# Patient Record
Sex: MALE
[De-identification: names, ages, dates, MRNs, and addresses within clinical notes are randomized per-mention and may not be internally consistent; named-entity substitution may affect disease eponyms.]

---

## 2018-01-19 ENCOUNTER — HOSPITAL ENCOUNTER (OUTPATIENT)
Dept: HOSPITAL 88 - OR | Age: 28
Discharge: HOME | End: 2018-01-19
Attending: INTERNAL MEDICINE
Payer: COMMERCIAL

## 2018-01-19 DIAGNOSIS — F41.9: ICD-10-CM

## 2018-01-19 DIAGNOSIS — K64.8: ICD-10-CM

## 2018-01-19 DIAGNOSIS — K57.30: ICD-10-CM

## 2018-01-19 DIAGNOSIS — R03.0: ICD-10-CM

## 2018-01-19 DIAGNOSIS — D12.0: ICD-10-CM

## 2018-01-19 DIAGNOSIS — K50.10: Primary | ICD-10-CM

## 2018-01-19 LAB
C DIFFICILE TOXIN A&B AMP PROB: NEGATIVE
LACTOFERRIN STL QL: NEGATIVE

## 2018-01-19 PROCEDURE — 45384 COLONOSCOPY W/LESION REMOVAL: CPT

## 2018-01-19 PROCEDURE — 45385 COLONOSCOPY W/LESION REMOVAL: CPT

## 2018-01-19 PROCEDURE — 83993 ASSAY FOR CALPROTECTIN FECAL: CPT

## 2018-01-19 PROCEDURE — 87045 FECES CULTURE AEROBIC BACT: CPT

## 2018-01-19 PROCEDURE — 45380 COLONOSCOPY AND BIOPSY: CPT

## 2018-01-19 PROCEDURE — 87493 C DIFF AMPLIFIED PROBE: CPT

## 2018-01-19 PROCEDURE — 87328 CRYPTOSPORIDIUM AG IA: CPT

## 2018-01-19 PROCEDURE — 83630 LACTOFERRIN FECAL (QUAL): CPT

## 2018-01-19 PROCEDURE — 87177 OVA AND PARASITES SMEARS: CPT

## 2018-01-19 NOTE — OPERATIVE REPORT
DATE OF PROCEDURE:  January 19, 2018 



REFERRING PHYSICIAN:  Dr. Eulogio Paris. 



PROCEDURE PERFORMED:  Colonoscopy with polypectomy and biopsies.  



INDICATIONS FOR COLONOSCOPY:  Is chronic diarrhea.



MEDICATION:  Patient was done under MAC.  Please see anesthesiologist's 

note.



PROCEDURE:  With the patient in the left lateral decubitus position, the 

flexible fiberoptic Olympus colonoscope was inserted into the rectum with 

ease and advanced all the way to the cecum.  Mucosa overlying the cecum 

appeared to be within normal limits.  The ileocecal valve was intubated, 

and the scope was advanced into the terminal ileum.  Biopsies were 

obtained.  The scope was then withdrawn back into the colon.  It was then 

withdrawn slowly, and 1 polyp was hot biopsied from the ascending colon.  

Some diverticular disease was noted in the transverse, descending and 

sigmoid colon.  There was some mild segmental colitis noted in the 

descending and the proximal sigmoid, and biopsies were obtained.  One polyp 

was snared from the distal sigmoid colon.  The rectum grossly appeared to 

be within normal limits.  The scope was then retroflexed into the distal 

rectum and small internal hemorrhoids were noted, none of which was 

actively bleeding.  The scope was then straightened out.  The scope was 

subsequently withdrawn.  An adequate stool specimen was secured and sent 

for the appropriate stool studies.  Patient tolerated the procedure well.



IMPRESSION:  

1. Ascending colon polyp hot biopsied.

2. Diverticulosis.

3. Segmental colitis, left colon, mild.  Biopsies obtained.

4. Sigmoid colon polyp snared.

5. Internal hemorrhoids, none actively bleeding.





PLAN:  Follow up histology.  Follow up stool studies.  Initiate Bentyl 10 

mg 1 p.o. t.i.d.  Patient will need a followup colonoscopy in 3 years.  



  









DD:  01/19/2018 12:24

DT:  01/19/2018 12:31

Job#:  [id number]

cc:EULOGIO PARIS DO

## 2019-06-28 ENCOUNTER — HOSPITAL ENCOUNTER (OUTPATIENT)
Dept: HOSPITAL 88 - US | Age: 29
End: 2019-06-28
Attending: FAMILY MEDICINE
Payer: COMMERCIAL

## 2019-06-28 DIAGNOSIS — I10: Primary | ICD-10-CM

## 2019-06-28 PROCEDURE — 76770 US EXAM ABDO BACK WALL COMP: CPT

## 2019-06-28 NOTE — DIAGNOSTIC IMAGING REPORT
EXAMINATION: Renal ultrasound.



CLINICAL HISTORY :Hypertension



COMPARISON: <None available.>



TECHNIQUE: Grayscale and color Doppler evaluation of the kidneys and bladder

was performed in transverse and longitudinal planes.



DISCUSSION:



RIGHT KIDNEY:  The right kidney measures 11.5 cm in length and shows normal

renal cortical echogenicity.  No hydronephrosis, shadowing calculi  or solid

mass lesions.



LEFT KIDNEY:  The left kidney measures 11.2 cm in length and shows normal renal

cortical echogenicity.  No hydronephrosis, shadowing calculi or solid mass

lesions.



BLADDER:  Unremarkable. Right and left ureteral jets are identified.



   



IMPRESSION: 

   

Unremarkable sonographic appearance of the kidneys.











Signed by: Dr. Bethel Mary M.D. on 6/28/2019 8:27 AM